# Patient Record
Sex: MALE | Race: ASIAN | URBAN - METROPOLITAN AREA
[De-identification: names, ages, dates, MRNs, and addresses within clinical notes are randomized per-mention and may not be internally consistent; named-entity substitution may affect disease eponyms.]

---

## 2020-01-15 ENCOUNTER — OFFICE (OUTPATIENT)
Dept: URBAN - METROPOLITAN AREA CLINIC 78 | Facility: CLINIC | Age: 45
End: 2020-01-15

## 2020-01-15 VITALS
DIASTOLIC BLOOD PRESSURE: 87 MMHG | SYSTOLIC BLOOD PRESSURE: 126 MMHG | HEIGHT: 68 IN | TEMPERATURE: 90.9 F | HEART RATE: 71 BPM | WEIGHT: 178 LBS

## 2020-01-15 DIAGNOSIS — R74.8 ABNORMAL LEVELS OF OTHER SERUM ENZYMES: ICD-10-CM

## 2020-01-15 DIAGNOSIS — B19.10 UNSPECIFIED VIRAL HEPATITIS B WITHOUT HEPATIC COMA: ICD-10-CM

## 2020-01-15 PROCEDURE — 99244 OFF/OP CNSLTJ NEW/EST MOD 40: CPT

## 2020-01-15 NOTE — SERVICEHPINOTES
JA CAMARGO   is a   44   year old Chinese male who is being seen in consultation at the request of   SHERRY HORTON   for hepatitis B. He has known of his diagnosis for many years. Reports being on Truvada in the past (saw Dr. Keith years ago). Thinks he took it for a few years and got to undetected viral level. Has been off medication for probably 6-8 years as he failed to continue to follow up after Dr. Keith moved away. Labs from last year show positive HBsAg and elevated liver enzymes. He has been meaning to get this addressed for awhile now. He feels well overall and has no other concerns today. Patient's father has hepatitis B and h/o HCC. Patient drinks wine and beer 2-3x/week and usually drinks 2-3 or more at a time. He is wanting to cut back. He denies herbal meds or Tylenol use.HIV testing last year was negative and he denies exposure risks since then. He is aware that Hep B can be transmitted sexually and he takes precautions for this.  5/3/19 plt 169, AST/ALT 94/214, Hep A ab tot pos, HBsAg neg, Hep C ab neg, HIV neg, AFP 3.2

## 2020-02-27 ENCOUNTER — OFFICE (OUTPATIENT)
Dept: URBAN - METROPOLITAN AREA CLINIC 78 | Facility: CLINIC | Age: 45
End: 2020-02-27

## 2020-02-27 VITALS
SYSTOLIC BLOOD PRESSURE: 106 MMHG | HEART RATE: 82 BPM | DIASTOLIC BLOOD PRESSURE: 78 MMHG | WEIGHT: 170 LBS | TEMPERATURE: 97.7 F | HEIGHT: 68 IN

## 2020-02-27 DIAGNOSIS — B19.10 UNSPECIFIED VIRAL HEPATITIS B WITHOUT HEPATIC COMA: ICD-10-CM

## 2020-02-27 DIAGNOSIS — R74.8 ABNORMAL LEVELS OF OTHER SERUM ENZYMES: ICD-10-CM

## 2020-02-27 PROCEDURE — 99214 OFFICE O/P EST MOD 30 MIN: CPT

## 2020-02-27 NOTE — SERVICEHPINOTES
45 yo Chinese male presents for f/u hepatitis B. Labs show eAg positive with high viral level. His liver enzymes are elevated but improved from previous which he attributes to cutting back on ETOH. Elzbieta was e-scribed to [facility] and patient states he got some paperwork from them but hasn't filled it out or been in touch with them. Therefore has not yet started on medication. U/S suggested mild fatty liver. Fibroscan not yet ordered given high ALT level on prior labs. He feels well overall and has no other concerns today. Prior hx: He has known of his diagnosis for many years. Reports being on Truvada in the past (saw Dr. Keith years ago). Thinks he took it for a few years and got to undetected viral level. Has been off medication for probably 6-8 years as he failed to continue to follow up after Dr. Keith moved away. Labs from last year show positive HBsAg and elevated liver enzymes. He has been meaning to get this addressed for awhile now. Patient's father has hepatitis B and h/o HCC. Patient drinks wine and beer 2-3x/week and usually drinks 2-3 or more at a time. He is wanting to cut back. He denies herbal meds or Tylenol use.HIV testing last year was negative and he denies exposure risks since then. He is aware that Hep B can be transmitted sexually and he takes precautions for this. 1/24/20 Hep D ab neg, HBV DNA 3.83 mill IU/ml, AFP 4.2, HBeAg pos, plt 154, AST/ALT 38/66, INR 1.0BR5/3/19 plt 169, AST/ALT 94/214, Hep A ab tot pos, HBsAg neg, Hep C ab neg, HIV neg, AFP 3.2